# Patient Record
Sex: MALE | Race: WHITE | NOT HISPANIC OR LATINO | Employment: FULL TIME | URBAN - METROPOLITAN AREA
[De-identification: names, ages, dates, MRNs, and addresses within clinical notes are randomized per-mention and may not be internally consistent; named-entity substitution may affect disease eponyms.]

---

## 2017-08-31 ENCOUNTER — LAB (OUTPATIENT)
Dept: LAB | Facility: HOSPITAL | Age: 52
End: 2017-08-31

## 2017-08-31 ENCOUNTER — CONSULT (OUTPATIENT)
Dept: ONCOLOGY | Facility: CLINIC | Age: 52
End: 2017-08-31

## 2017-08-31 VITALS
RESPIRATION RATE: 16 BRPM | SYSTOLIC BLOOD PRESSURE: 162 MMHG | BODY MASS INDEX: 25.83 KG/M2 | TEMPERATURE: 98.3 F | WEIGHT: 174.4 LBS | OXYGEN SATURATION: 98 % | HEART RATE: 77 BPM | DIASTOLIC BLOOD PRESSURE: 98 MMHG | HEIGHT: 69 IN

## 2017-08-31 DIAGNOSIS — R74.8 ABNORMAL LIVER ENZYMES: Primary | ICD-10-CM

## 2017-08-31 DIAGNOSIS — D70.0 CONGENITAL NEUTROPENIA (HCC): Primary | ICD-10-CM

## 2017-08-31 DIAGNOSIS — D70.0 CONGENITAL NEUTROPENIA (HCC): ICD-10-CM

## 2017-08-31 LAB
ALBUMIN SERPL-MCNC: 4.9 G/DL (ref 3.5–5.2)
ALBUMIN/GLOB SERPL: 1.7 G/DL (ref 1.1–2.4)
ALP SERPL-CCNC: 66 U/L (ref 38–116)
ALT SERPL W P-5'-P-CCNC: 54 U/L (ref 0–41)
ANION GAP SERPL CALCULATED.3IONS-SCNC: 14.4 MMOL/L
AST SERPL-CCNC: 38 U/L (ref 0–40)
BASOPHILS # BLD AUTO: 0.03 10*3/MM3 (ref 0–0.1)
BASOPHILS NFR BLD AUTO: 0.6 % (ref 0–1.1)
BILIRUB SERPL-MCNC: 0.5 MG/DL (ref 0.1–1.2)
BUN BLD-MCNC: 19 MG/DL (ref 6–20)
BUN/CREAT SERPL: 23.8 (ref 7.3–30)
CALCIUM SPEC-SCNC: 9.8 MG/DL (ref 8.5–10.2)
CHLORIDE SERPL-SCNC: 99 MMOL/L (ref 98–107)
CO2 SERPL-SCNC: 24.6 MMOL/L (ref 22–29)
CREAT BLD-MCNC: 0.8 MG/DL (ref 0.7–1.3)
DEPRECATED RDW RBC AUTO: 40.7 FL (ref 37–49)
EOSINOPHIL # BLD AUTO: 0.05 10*3/MM3 (ref 0–0.36)
EOSINOPHIL NFR BLD AUTO: 1 % (ref 1–5)
ERYTHROCYTE [DISTWIDTH] IN BLOOD BY AUTOMATED COUNT: 12.1 % (ref 11.7–14.5)
FERRITIN SERPL-MCNC: 276.2 NG/ML (ref 30–400)
FOLATE SERPL-MCNC: 11.22 NG/ML (ref 4.78–24.2)
GFR SERPL CREATININE-BSD FRML MDRD: 102 ML/MIN/1.73
GLOBULIN UR ELPH-MCNC: 2.9 GM/DL (ref 1.8–3.5)
GLUCOSE BLD-MCNC: 89 MG/DL (ref 74–124)
HCT VFR BLD AUTO: 41.5 % (ref 40–49)
HGB BLD-MCNC: 15.3 G/DL (ref 13.5–16.5)
IMM GRANULOCYTES # BLD: 0.07 10*3/MM3 (ref 0–0.03)
IMM GRANULOCYTES NFR BLD: 1.5 % (ref 0–0.5)
IRON 24H UR-MRATE: 128 MCG/DL (ref 59–158)
IRON SATN MFR SERPL: 36 % (ref 14–48)
LYMPHOCYTES # BLD AUTO: 1.6 10*3/MM3 (ref 1–3.5)
LYMPHOCYTES NFR BLD AUTO: 33.2 % (ref 20–49)
MCH RBC QN AUTO: 34 PG (ref 27–33)
MCHC RBC AUTO-ENTMCNC: 36.9 G/DL (ref 32–35)
MCV RBC AUTO: 92.2 FL (ref 83–97)
MONOCYTES # BLD AUTO: 0.52 10*3/MM3 (ref 0.25–0.8)
MONOCYTES NFR BLD AUTO: 10.8 % (ref 4–12)
NEUTROPHILS # BLD AUTO: 2.55 10*3/MM3 (ref 1.5–7)
NEUTROPHILS NFR BLD AUTO: 52.9 % (ref 39–75)
NRBC BLD MANUAL-RTO: 0 /100 WBC (ref 0–0)
PLATELET # BLD AUTO: 170 10*3/MM3 (ref 150–375)
PMV BLD AUTO: 9.7 FL (ref 8.9–12.1)
POTASSIUM BLD-SCNC: 4.5 MMOL/L (ref 3.5–4.7)
PROT SERPL-MCNC: 7.8 G/DL (ref 6.3–8)
RBC # BLD AUTO: 4.5 10*6/MM3 (ref 4.3–5.5)
SODIUM BLD-SCNC: 138 MMOL/L (ref 134–145)
TIBC SERPL-MCNC: 354 MCG/DL (ref 249–505)
TRANSFERRIN SERPL-MCNC: 253 MG/DL (ref 200–360)
VIT B12 BLD-MCNC: 366 PG/ML (ref 211–946)
WBC NRBC COR # BLD: 4.82 10*3/MM3 (ref 4–10)

## 2017-08-31 PROCEDURE — 36415 COLL VENOUS BLD VENIPUNCTURE: CPT | Performed by: INTERNAL MEDICINE

## 2017-08-31 PROCEDURE — 85025 COMPLETE CBC W/AUTO DIFF WBC: CPT | Performed by: INTERNAL MEDICINE

## 2017-08-31 PROCEDURE — 84466 ASSAY OF TRANSFERRIN: CPT | Performed by: INTERNAL MEDICINE

## 2017-08-31 PROCEDURE — 80053 COMPREHEN METABOLIC PANEL: CPT | Performed by: INTERNAL MEDICINE

## 2017-08-31 PROCEDURE — 83540 ASSAY OF IRON: CPT | Performed by: INTERNAL MEDICINE

## 2017-08-31 PROCEDURE — 99244 OFF/OP CNSLTJ NEW/EST MOD 40: CPT | Performed by: INTERNAL MEDICINE

## 2017-08-31 PROCEDURE — 82746 ASSAY OF FOLIC ACID SERUM: CPT | Performed by: INTERNAL MEDICINE

## 2017-08-31 PROCEDURE — 82607 VITAMIN B-12: CPT | Performed by: INTERNAL MEDICINE

## 2017-08-31 PROCEDURE — 82728 ASSAY OF FERRITIN: CPT | Performed by: INTERNAL MEDICINE

## 2017-08-31 PROCEDURE — 36416 COLLJ CAPILLARY BLOOD SPEC: CPT | Performed by: INTERNAL MEDICINE

## 2017-08-31 RX ORDER — NAPROXEN SODIUM 220 MG
220 TABLET ORAL 2 TIMES DAILY PRN
COMMUNITY
End: 2017-11-15

## 2017-08-31 RX ORDER — IBUPROFEN 200 MG
200 TABLET ORAL EVERY 6 HOURS PRN
COMMUNITY
End: 2017-11-15

## 2017-09-01 LAB
ALBUMIN SERPL-MCNC: 4.2 G/DL (ref 2.9–4.4)
ALBUMIN/GLOB SERPL: 1.5 {RATIO} (ref 0.7–1.7)
ALPHA1 GLOB FLD ELPH-MCNC: 0.2 G/DL (ref 0–0.4)
ALPHA2 GLOB SERPL ELPH-MCNC: 0.7 G/DL (ref 0.4–1)
ANA SER QL: NEGATIVE
B-GLOBULIN SERPL ELPH-MCNC: 1.2 G/DL (ref 0.7–1.3)
GAMMA GLOB SERPL ELPH-MCNC: 1 G/DL (ref 0.4–1.8)
GLOBULIN SER CALC-MCNC: 3 G/DL (ref 2.2–3.9)
IGA SERPL-MCNC: 298 MG/DL (ref 90–386)
IGG SERPL-MCNC: 837 MG/DL (ref 700–1600)
IGM SERPL-MCNC: 213 MG/DL (ref 20–172)
INTERPRETATION SERPL IEP-IMP: ABNORMAL
KAPPA LC SERPL-MCNC: 16.8 MG/L (ref 3.3–19.4)
KAPPA LC/LAMBDA SER: 0.95 {RATIO} (ref 0.26–1.65)
LAMBDA LC FREE SERPL-MCNC: 17.7 MG/L (ref 5.7–26.3)
Lab: ABNORMAL
M-SPIKE: ABNORMAL G/DL
PROT SERPL-MCNC: 7.2 G/DL (ref 6–8.5)

## 2017-09-07 LAB — REF LAB TEST METHOD: NORMAL

## 2017-09-13 ENCOUNTER — LAB (OUTPATIENT)
Dept: LAB | Facility: HOSPITAL | Age: 52
End: 2017-09-13

## 2017-09-13 ENCOUNTER — CLINICAL SUPPORT (OUTPATIENT)
Dept: ONCOLOGY | Facility: HOSPITAL | Age: 52
End: 2017-09-13

## 2017-09-13 DIAGNOSIS — D70.0 CONGENITAL NEUTROPENIA (HCC): ICD-10-CM

## 2017-09-13 LAB
BASOPHILS # BLD AUTO: 0.02 10*3/MM3 (ref 0–0.1)
BASOPHILS NFR BLD AUTO: 0.5 % (ref 0–1.1)
DEPRECATED RDW RBC AUTO: 40.8 FL (ref 37–49)
EOSINOPHIL # BLD AUTO: 0.09 10*3/MM3 (ref 0–0.36)
EOSINOPHIL NFR BLD AUTO: 2.2 % (ref 1–5)
ERYTHROCYTE [DISTWIDTH] IN BLOOD BY AUTOMATED COUNT: 12.1 % (ref 11.7–14.5)
HCT VFR BLD AUTO: 39.8 % (ref 40–49)
HGB BLD-MCNC: 14.7 G/DL (ref 13.5–16.5)
IMM GRANULOCYTES # BLD: 0 10*3/MM3 (ref 0–0.03)
IMM GRANULOCYTES NFR BLD: 0 % (ref 0–0.5)
LYMPHOCYTES # BLD AUTO: 1.36 10*3/MM3 (ref 1–3.5)
LYMPHOCYTES NFR BLD AUTO: 33.8 % (ref 20–49)
MCH RBC QN AUTO: 33.9 PG (ref 27–33)
MCHC RBC AUTO-ENTMCNC: 36.9 G/DL (ref 32–35)
MCV RBC AUTO: 91.9 FL (ref 83–97)
MONOCYTES # BLD AUTO: 0.46 10*3/MM3 (ref 0.25–0.8)
MONOCYTES NFR BLD AUTO: 11.4 % (ref 4–12)
NEUTROPHILS # BLD AUTO: 2.09 10*3/MM3 (ref 1.5–7)
NEUTROPHILS NFR BLD AUTO: 52.1 % (ref 39–75)
NRBC BLD MANUAL-RTO: 0 /100 WBC (ref 0–0)
PLATELET # BLD AUTO: 175 10*3/MM3 (ref 150–375)
PMV BLD AUTO: 9.5 FL (ref 8.9–12.1)
RBC # BLD AUTO: 4.33 10*6/MM3 (ref 4.3–5.5)
WBC NRBC COR # BLD: 4.02 10*3/MM3 (ref 4–10)

## 2017-09-13 PROCEDURE — 36416 COLLJ CAPILLARY BLOOD SPEC: CPT | Performed by: INTERNAL MEDICINE

## 2017-09-13 PROCEDURE — 85025 COMPLETE CBC W/AUTO DIFF WBC: CPT | Performed by: INTERNAL MEDICINE

## 2017-09-13 NOTE — PROGRESS NOTES
CBC reviewed with patient. We are monitoring patient's WBC. WBC 4.02 today. Patient has no complaints. We will continue to monitor these to see if we can get a better picture for when patient sees MD next. Looking to see if they trend downwards or stay stable etc. Pt has no questions or concerns and returns in two weeks.

## 2017-09-27 ENCOUNTER — CLINICAL SUPPORT (OUTPATIENT)
Dept: ONCOLOGY | Facility: HOSPITAL | Age: 52
End: 2017-09-27

## 2017-09-27 ENCOUNTER — LAB (OUTPATIENT)
Dept: LAB | Facility: HOSPITAL | Age: 52
End: 2017-09-27

## 2017-09-27 DIAGNOSIS — D70.0 CONGENITAL NEUTROPENIA (HCC): ICD-10-CM

## 2017-09-27 LAB
BASOPHILS # BLD AUTO: 0.02 10*3/MM3 (ref 0–0.1)
BASOPHILS NFR BLD AUTO: 0.6 % (ref 0–1.1)
DEPRECATED RDW RBC AUTO: 41.6 FL (ref 37–49)
EOSINOPHIL # BLD AUTO: 0.04 10*3/MM3 (ref 0–0.36)
EOSINOPHIL NFR BLD AUTO: 1.1 % (ref 1–5)
ERYTHROCYTE [DISTWIDTH] IN BLOOD BY AUTOMATED COUNT: 12.2 % (ref 11.7–14.5)
HCT VFR BLD AUTO: 41.1 % (ref 40–49)
HGB BLD-MCNC: 15 G/DL (ref 13.5–16.5)
IMM GRANULOCYTES # BLD: 0.03 10*3/MM3 (ref 0–0.03)
IMM GRANULOCYTES NFR BLD: 0.8 % (ref 0–0.5)
LYMPHOCYTES # BLD AUTO: 1.29 10*3/MM3 (ref 1–3.5)
LYMPHOCYTES NFR BLD AUTO: 35.6 % (ref 20–49)
MCH RBC QN AUTO: 33.8 PG (ref 27–33)
MCHC RBC AUTO-ENTMCNC: 36.5 G/DL (ref 32–35)
MCV RBC AUTO: 92.6 FL (ref 83–97)
MONOCYTES # BLD AUTO: 0.35 10*3/MM3 (ref 0.25–0.8)
MONOCYTES NFR BLD AUTO: 9.7 % (ref 4–12)
NEUTROPHILS # BLD AUTO: 1.89 10*3/MM3 (ref 1.5–7)
NEUTROPHILS NFR BLD AUTO: 52.2 % (ref 39–75)
NRBC BLD MANUAL-RTO: 0 /100 WBC (ref 0–0)
PLATELET # BLD AUTO: 191 10*3/MM3 (ref 150–375)
PMV BLD AUTO: 9.2 FL (ref 8.9–12.1)
RBC # BLD AUTO: 4.44 10*6/MM3 (ref 4.3–5.5)
WBC NRBC COR # BLD: 3.62 10*3/MM3 (ref 4–10)

## 2017-09-27 PROCEDURE — 85025 COMPLETE CBC W/AUTO DIFF WBC: CPT

## 2017-09-27 PROCEDURE — 36416 COLLJ CAPILLARY BLOOD SPEC: CPT | Performed by: INTERNAL MEDICINE

## 2017-09-27 NOTE — PROGRESS NOTES
CBC reviewed with pt. Reviewing wbc specifically. Today 3.6, has trended downward from 4.8 to 4.0 to 3.6.  He returns in 2 weeks and will see Dr Reagan at this appointment, cbc will be redrawn at that time.  Pt denies any complaints. Copy of labs given.

## 2017-10-11 ENCOUNTER — APPOINTMENT (OUTPATIENT)
Dept: ONCOLOGY | Facility: CLINIC | Age: 52
End: 2017-10-11

## 2017-10-11 ENCOUNTER — APPOINTMENT (OUTPATIENT)
Dept: LAB | Facility: HOSPITAL | Age: 52
End: 2017-10-11

## 2017-11-15 ENCOUNTER — OFFICE VISIT (OUTPATIENT)
Dept: ONCOLOGY | Facility: CLINIC | Age: 52
End: 2017-11-15

## 2017-11-15 ENCOUNTER — LAB (OUTPATIENT)
Dept: LAB | Facility: HOSPITAL | Age: 52
End: 2017-11-15

## 2017-11-15 VITALS
OXYGEN SATURATION: 98 % | DIASTOLIC BLOOD PRESSURE: 96 MMHG | SYSTOLIC BLOOD PRESSURE: 178 MMHG | HEIGHT: 69 IN | RESPIRATION RATE: 16 BRPM | BODY MASS INDEX: 26.72 KG/M2 | TEMPERATURE: 98.4 F | HEART RATE: 78 BPM | WEIGHT: 180.4 LBS

## 2017-11-15 DIAGNOSIS — R74.8 ABNORMAL LIVER ENZYMES: ICD-10-CM

## 2017-11-15 DIAGNOSIS — D70.0 CONGENITAL NEUTROPENIA (HCC): ICD-10-CM

## 2017-11-15 DIAGNOSIS — D70.0 CONGENITAL NEUTROPENIA (HCC): Primary | ICD-10-CM

## 2017-11-15 LAB
BASOPHILS # BLD AUTO: 0.02 10*3/MM3 (ref 0–0.1)
BASOPHILS NFR BLD AUTO: 0.6 % (ref 0–1.1)
DEPRECATED RDW RBC AUTO: 39.4 FL (ref 37–49)
EOSINOPHIL # BLD AUTO: 0.09 10*3/MM3 (ref 0–0.36)
EOSINOPHIL NFR BLD AUTO: 2.6 % (ref 1–5)
ERYTHROCYTE [DISTWIDTH] IN BLOOD BY AUTOMATED COUNT: 11.9 % (ref 11.7–14.5)
HCT VFR BLD AUTO: 39.2 % (ref 40–49)
HGB BLD-MCNC: 14.6 G/DL (ref 13.5–16.5)
HGB RETIC QN: 37.1 PG (ref 29.8–36.1)
IMM GRANULOCYTES # BLD: 0.05 10*3/MM3 (ref 0–0.03)
IMM GRANULOCYTES NFR BLD: 1.5 % (ref 0–0.5)
IMM RETICS NFR: 10.4 % (ref 3–15.8)
LYMPHOCYTES # BLD AUTO: 1.59 10*3/MM3 (ref 1–3.5)
LYMPHOCYTES NFR BLD AUTO: 46.5 % (ref 20–49)
MCH RBC QN AUTO: 34 PG (ref 27–33)
MCHC RBC AUTO-ENTMCNC: 37.2 G/DL (ref 32–35)
MCV RBC AUTO: 91.4 FL (ref 83–97)
MONOCYTES # BLD AUTO: 0.5 10*3/MM3 (ref 0.25–0.8)
MONOCYTES NFR BLD AUTO: 14.6 % (ref 4–12)
NEUTROPHILS # BLD AUTO: 1.17 10*3/MM3 (ref 1.5–7)
NEUTROPHILS NFR BLD AUTO: 34.2 % (ref 39–75)
NRBC BLD MANUAL-RTO: 0 /100 WBC (ref 0–0)
PLATELET # BLD AUTO: 172 10*3/MM3 (ref 150–375)
PMV BLD AUTO: 9.5 FL (ref 8.9–12.1)
RBC # BLD AUTO: 4.29 10*6/MM3 (ref 4.3–5.5)
RETICS/RBC NFR AUTO: 1.44 % (ref 0.6–2)
WBC NRBC COR # BLD: 3.42 10*3/MM3 (ref 4–10)

## 2017-11-15 PROCEDURE — 36416 COLLJ CAPILLARY BLOOD SPEC: CPT | Performed by: INTERNAL MEDICINE

## 2017-11-15 PROCEDURE — 99213 OFFICE O/P EST LOW 20 MIN: CPT | Performed by: INTERNAL MEDICINE

## 2017-11-15 PROCEDURE — 85046 RETICYTE/HGB CONCENTRATE: CPT | Performed by: INTERNAL MEDICINE

## 2017-11-15 PROCEDURE — 85025 COMPLETE CBC W/AUTO DIFF WBC: CPT | Performed by: INTERNAL MEDICINE

## 2017-11-15 NOTE — PROGRESS NOTES
Subjective     REASON FOR CONSULTATION:  Leukopenia    REQUESTING PHYSICIAN:  Thuy Pierson M.D.    History of Present Illness patient is a 52-year-old  with leukopenia comes in for routine evaluation.  We checked  blood counts every 2 weeks to see if he was cycling his white count and then when he was supposed to come in in 8 weeks and missed his appointment and removed in a month because of some family emergencies.  Today's white count is actually low at 3400 with an ANC of 1.17.  The 3 previous CBCs were actually within normal limits  He states he  continues to drink 2-3 glasses of wine on a daily basis but this does not explain the fluctuation. In addition he has taken 4 Advil a day for the last 3 days and this may be an aggravating factor although 3 doses is unlikely to cause a problem.  His B12 ,folate, YARITZA , ferritin and SPEP were normal.    I recommended this time that he stay off Advil and Aleve and just use Tylenol for his pain and cut back on his alcohol drinking to one glass of wine a day and I'll send a flow cytometry to make sure there is no other pathology but suspect based on his family history that there may be a constitutional leukopenia and  that the use of Motrin or Aleve may be aggravating it      Past Medical History:   Diagnosis Date   • Cellulitis of right foot 2014   • Gout 2014   • H/O Toe fracture, right         No past surgical history on file.     HEME HISTORY:  patient is a 52-year-old  with no chronic illnesses except for muscle pains and arthralgias related to his active lifestyle, was noted on 3 occasions in February May and July of this year to have a low white count of 2600 with an ANC of just above 1000 and normal hemoglobin and platelet count.  He has had WBCs as far back as 2013 which was low 3600, I did find 2 white counts in 2015 when he had an acute attack of gout which were in the normal range but this obviously is not his baseline.  He has had no problems with  recurrent infections and is very healthy.  He does take Motrin and Aleve on a daily basis for aches and pains related to bicycling and exercising.  He also drinks at least 2 glasses of wine a day and has been doing this for quite a while.  In addition he tells me that his younger son who was noted to have a low white count and was sent to a pediatric hematologist who could find no reason for it and thought it was just constitutional.  He has had no fever sweats weight loss or other systemic illnesses    He has started taking a multivitamin for the last month since his white count stayed low.  He has had no change in his diet is not a vegetarian.    He has been having colonoscopies since age of 36 because his mother  with colon cancer at age 40 and he is had one polyp which was a low-grade adenoma.  He has  never had CAT scans of the chest or abdomen    Current Outpatient Prescriptions on File Prior to Visit   Medication Sig Dispense Refill   • Multiple Vitamin (ONE-A-DAY MENS PO) Take  by mouth Daily.     • [DISCONTINUED] ibuprofen (ADVIL,MOTRIN) 200 MG tablet Take 200 mg by mouth Every 6 (Six) Hours As Needed for Mild Pain .     • [DISCONTINUED] naproxen sodium (ALEVE) 220 MG tablet Take 220 mg by mouth 2 (Two) Times a Day As Needed.       No current facility-administered medications on file prior to visit.         ALLERGIES:    Allergies   Allergen Reactions   • Sulfa Antibiotics         Social History     Social History   • Marital status:      Spouse name: Janette   • Number of children: 2   • Years of education: High school     Occupational History   • [employer]     Social History Main Topics   • Smoking status: Current Some Day Smoker     Types: Cigarettes   • Smokeless tobacco: None   • Alcohol use Yes      Comment: Daily   • Drug use: No   • Sexual activity: Not Asked     Other Topics Concern   • None     Social History Narrative        Family History   Problem Relation Age of Onset   • Colon  "cancer Mother    • Heart disease Mother    • Cirrhosis Father    • Heart disease Father    • Hypertension Father    • No Known Problems Sister    • No Known Problems Son    • No Known Problems Sister    • No Known Problems Son         Review of Systems   Musculoskeletal: Positive for arthralgias and myalgias.        Objective     Vitals:    11/15/17 0852   BP: 178/96   Pulse: 78   Resp: 16   Temp: 98.4 °F (36.9 °C)   TempSrc: Oral   SpO2: 98%   Weight: 180 lb 6.4 oz (81.8 kg)   Height: 69.33\" (176.1 cm)   PainSc: 0-No pain     Current Status 11/15/2017   ECOG score 0       Physical Exam    GENERAL:  Well-developed, well-nourished in no acute distress.   SKIN:  Warm, dry without rashes, purpura or petechiae.?  Spider angioma on his chest  EYES:  Pupils equal, round and reactive to light.  EOMs intact.  Conjunctivae normal.  EARS:  Hearing intact.  NOSE:  Septum midline.  No excoriations or nasal discharge.  MOUTH:  Tongue is well-papillated; no stomatitis or ulcers.  Lips normal.  THROAT:  Oropharynx without lesions or exudates.  NECK:  Supple with good range of motion; no thyromegaly or masses, no JVD.  LYMPHATICS:  No cervical, supraclavicular, axillary or inguinal adenopathy.  CHEST:  Lungs clear to auscultation. Good airflow.  CARDIAC:  Regular rate and rhythm without murmurs, rubs or gallops. Normal S1,S2.  ABDOMEN:  Soft, nontender with no hepatosplenomegaly or masses.  EXTREMITIES:  No clubbing, cyanosis or edema.  NEUROLOGICAL:  Cranial Nerves II-XII grossly intact.  No focal neurological deficits.  PSYCHIATRIC:  Normal affect and mood.        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  Plan  1.Avoid Motrin and Aleve and use Tylenol for pain  2.  Flow cytometry on peripheral blood  3.  Decrease alcohol intake to one glass of wine or less per day  4.  CBC in 3 months and see me in 6 months and I'll call him if there is any abnormality in the flow cytometry

## 2018-02-07 ENCOUNTER — LAB (OUTPATIENT)
Dept: LAB | Facility: HOSPITAL | Age: 53
End: 2018-02-07

## 2018-02-07 DIAGNOSIS — D70.0 CONGENITAL NEUTROPENIA (HCC): ICD-10-CM

## 2018-02-07 LAB
BASOPHILS # BLD AUTO: 0.02 10*3/MM3 (ref 0–0.1)
BASOPHILS NFR BLD AUTO: 0.5 % (ref 0–1.1)
DEPRECATED RDW RBC AUTO: 40.4 FL (ref 37–49)
EOSINOPHIL # BLD AUTO: 0.03 10*3/MM3 (ref 0–0.36)
EOSINOPHIL NFR BLD AUTO: 0.7 % (ref 1–5)
ERYTHROCYTE [DISTWIDTH] IN BLOOD BY AUTOMATED COUNT: 12 % (ref 11.7–14.5)
HCT VFR BLD AUTO: 41.8 % (ref 40–49)
HGB BLD-MCNC: 15.2 G/DL (ref 13.5–16.5)
IMM GRANULOCYTES # BLD: 0.02 10*3/MM3 (ref 0–0.03)
IMM GRANULOCYTES NFR BLD: 0.5 % (ref 0–0.5)
LYMPHOCYTES # BLD AUTO: 1.18 10*3/MM3 (ref 1–3.5)
LYMPHOCYTES NFR BLD AUTO: 28.2 % (ref 20–49)
MCH RBC QN AUTO: 33.5 PG (ref 27–33)
MCHC RBC AUTO-ENTMCNC: 36.4 G/DL (ref 32–35)
MCV RBC AUTO: 92.1 FL (ref 83–97)
MONOCYTES # BLD AUTO: 0.45 10*3/MM3 (ref 0.25–0.8)
MONOCYTES NFR BLD AUTO: 10.8 % (ref 4–12)
NEUTROPHILS # BLD AUTO: 2.48 10*3/MM3 (ref 1.5–7)
NEUTROPHILS NFR BLD AUTO: 59.3 % (ref 39–75)
NRBC BLD MANUAL-RTO: 0 /100 WBC (ref 0–0)
PLATELET # BLD AUTO: 181 10*3/MM3 (ref 150–375)
PMV BLD AUTO: 9.4 FL (ref 8.9–12.1)
RBC # BLD AUTO: 4.54 10*6/MM3 (ref 4.3–5.5)
WBC NRBC COR # BLD: 4.18 10*3/MM3 (ref 4–10)

## 2018-02-07 PROCEDURE — 36416 COLLJ CAPILLARY BLOOD SPEC: CPT | Performed by: INTERNAL MEDICINE

## 2018-02-07 PROCEDURE — 85025 COMPLETE CBC W/AUTO DIFF WBC: CPT | Performed by: INTERNAL MEDICINE

## 2018-05-08 ENCOUNTER — APPOINTMENT (OUTPATIENT)
Dept: LAB | Facility: HOSPITAL | Age: 53
End: 2018-05-08

## 2018-05-08 ENCOUNTER — APPOINTMENT (OUTPATIENT)
Dept: ONCOLOGY | Facility: CLINIC | Age: 53
End: 2018-05-08

## 2018-06-05 ENCOUNTER — LAB (OUTPATIENT)
Dept: LAB | Facility: HOSPITAL | Age: 53
End: 2018-06-05

## 2018-06-05 ENCOUNTER — OFFICE VISIT (OUTPATIENT)
Dept: ONCOLOGY | Facility: CLINIC | Age: 53
End: 2018-06-05

## 2018-06-05 VITALS
TEMPERATURE: 98.7 F | RESPIRATION RATE: 16 BRPM | HEART RATE: 72 BPM | DIASTOLIC BLOOD PRESSURE: 80 MMHG | OXYGEN SATURATION: 97 % | BODY MASS INDEX: 26.36 KG/M2 | SYSTOLIC BLOOD PRESSURE: 140 MMHG | WEIGHT: 178 LBS | HEIGHT: 69 IN

## 2018-06-05 DIAGNOSIS — D70.0 CONGENITAL NEUTROPENIA (HCC): ICD-10-CM

## 2018-06-05 DIAGNOSIS — D70.0 CONGENITAL NEUTROPENIA (HCC): Primary | ICD-10-CM

## 2018-06-05 PROBLEM — D72.819 LEUKOPENIA: Status: ACTIVE | Noted: 2018-06-05

## 2018-06-05 LAB
BASOPHILS # BLD AUTO: 0.01 10*3/MM3 (ref 0–0.1)
BASOPHILS NFR BLD AUTO: 0.3 % (ref 0–1.1)
DEPRECATED RDW RBC AUTO: 40.1 FL (ref 37–49)
EOSINOPHIL # BLD AUTO: 0.09 10*3/MM3 (ref 0–0.36)
EOSINOPHIL NFR BLD AUTO: 2.6 % (ref 1–5)
ERYTHROCYTE [DISTWIDTH] IN BLOOD BY AUTOMATED COUNT: 12 % (ref 11.7–14.5)
HCT VFR BLD AUTO: 40.5 % (ref 40–49)
HGB BLD-MCNC: 14.8 G/DL (ref 13.5–16.5)
IMM GRANULOCYTES # BLD: 0.02 10*3/MM3 (ref 0–0.03)
IMM GRANULOCYTES NFR BLD: 0.6 % (ref 0–0.5)
LYMPHOCYTES # BLD AUTO: 1.47 10*3/MM3 (ref 1–3.5)
LYMPHOCYTES NFR BLD AUTO: 42.9 % (ref 20–49)
MCH RBC QN AUTO: 33.4 PG (ref 27–33)
MCHC RBC AUTO-ENTMCNC: 36.5 G/DL (ref 32–35)
MCV RBC AUTO: 91.4 FL (ref 83–97)
MONOCYTES # BLD AUTO: 0.39 10*3/MM3 (ref 0.25–0.8)
MONOCYTES NFR BLD AUTO: 11.4 % (ref 4–12)
NEUTROPHILS # BLD AUTO: 1.45 10*3/MM3 (ref 1.5–7)
NEUTROPHILS NFR BLD AUTO: 42.2 % (ref 39–75)
NRBC BLD MANUAL-RTO: 0 /100 WBC (ref 0–0)
PLATELET # BLD AUTO: 174 10*3/MM3 (ref 150–375)
PMV BLD AUTO: 9.4 FL (ref 8.9–12.1)
RBC # BLD AUTO: 4.43 10*6/MM3 (ref 4.3–5.5)
WBC NRBC COR # BLD: 3.43 10*3/MM3 (ref 4–10)

## 2018-06-05 PROCEDURE — 99213 OFFICE O/P EST LOW 20 MIN: CPT | Performed by: INTERNAL MEDICINE

## 2018-06-05 PROCEDURE — 85025 COMPLETE CBC W/AUTO DIFF WBC: CPT | Performed by: INTERNAL MEDICINE

## 2018-06-05 PROCEDURE — 36415 COLL VENOUS BLD VENIPUNCTURE: CPT | Performed by: INTERNAL MEDICINE

## 2018-06-05 NOTE — PROGRESS NOTES
Subjective     REASON FOR CONSULTATION:   1. Leukopenia-likely constitutional although alcohol and Advil may be contributing    REQUESTING PHYSICIAN:  Thuy Pierson M.D.    History of Present Illness patient is a 52-year-old  with leukopenia comes in for routine evaluation.  We checked  blood counts every 2 weeks to see if he was cycling his white count and then when he was supposed to come in in 8 weeks and missed his appointment and removed in a month because of some family emergencies.  Today's white count is actually low at 3400 with an ANC of 1.17.  The 3 previous CBCs were actually within normal limits  He states he  continues to drink 2-3 glasses of wine on a daily basis but this does not explain the fluctuation. In addition he has taken 4 Advil a day for the last 3 days and this may be an aggravating factor although 3 doses is unlikely to cause a problem.  His B12 ,folate, YARITZA , ferritin and SPEP were normal.  Flow cytometry on his peripheral blood in September was negative     based on his family history with his son having a low white count there may be a constitutional leukopenia and  that the use of Motrin or Aleve may be aggravating it      Past Medical History:   Diagnosis Date   • Cellulitis of right foot 2014   • Gout 2014   • H/O Toe fracture, right    • History of colon polyps    • Hyperlipidemia         Past Surgical History:   Procedure Laterality Date   • COLONOSCOPY     • TONSILLECTOMY          HEME HISTORY:  patient is a 52-year-old  with no chronic illnesses except for muscle pains and arthralgias related to his active lifestyle, was noted on 3 occasions in February May and July of this year to have a low white count of 2600 with an ANC of just above 1000 and normal hemoglobin and platelet count.  He has had WBCs as far back as 2013 which was low 3600, I did find 2 white counts in 2015 when he had an acute attack of gout which were in the normal range but this obviously is not his  baseline.  He has had no problems with recurrent infections and is very healthy.  He does take Motrin and Aleve on a daily basis for aches and pains related to bicycling and exercising.  He also drinks at least 2 glasses of wine a day and has been doing this for quite a while.  In addition he tells me that his younger son who was noted to have a low white count and was sent to a pediatric hematologist who could find no reason for it and thought it was just constitutional.  He has had no fever sweats weight loss or other systemic illnesses    He has started taking a multivitamin for the last month since his white count stayed low.  He has had no change in his diet is not a vegetarian.    He has been having colonoscopies since age of 36 because his mother  with colon cancer at age 40 and he is had one polyp which was a low-grade adenoma.  He has  never had CAT scans of the chest or abdomen    Current Outpatient Prescriptions on File Prior to Visit   Medication Sig Dispense Refill   • Multiple Vitamin (ONE-A-DAY MENS PO) Take  by mouth Daily.       No current facility-administered medications on file prior to visit.         ALLERGIES:    Allergies   Allergen Reactions   • Sulfa Antibiotics         Social History     Social History   • Marital status:      Spouse name: Janette   • Number of children: 2   • Years of education: High school     Occupational History   • [employer]     Social History Main Topics   • Smoking status: Current Some Day Smoker     Types: Cigarettes   • Smokeless tobacco: Never Used      Comment: 2-3 Cigarettes weekly   • Alcohol use Yes      Comment: Daily   • Drug use: No     Other Topics Concern   • Not on file        Family History   Problem Relation Age of Onset   • Colon cancer Mother 40   • Heart disease Mother    • Cirrhosis Father    • Heart disease Father    • Hypertension Father    • No Known Problems Sister    • No Known Problems Son    • No Known Problems Sister    • No  "Known Problems Son         Review of Systems   Musculoskeletal: Positive for arthralgias and myalgias.        Objective     Vitals:    06/05/18 1210   BP: 140/80   Pulse: 72   Resp: 16   Temp: 98.7 °F (37.1 °C)   SpO2: 97%  Comment: at rest   Weight: 80.7 kg (178 lb)   Height: 176 cm (69.29\")  Comment: new ht w/shoes   PainSc: 0-No pain     Current Status 6/5/2018   ECOG score 0       Physical Exam    GENERAL:  Well-developed, well-nourished in no acute distress.   SKIN:  Warm, dry without rashes, purpura or petechiae.?  Spider angioma on his chest  EYES:  Pupils equal, round and reactive to light.  EOMs intact.  Conjunctivae normal.  EARS:  Hearing intact.  NOSE:  Septum midline.  No excoriations or nasal discharge.  MOUTH:  Tongue is well-papillated; no stomatitis or ulcers.  Lips normal.  THROAT:  Oropharynx without lesions or exudates.  NECK:  Supple with good range of motion; no thyromegaly or masses, no JVD.  LYMPHATICS:  No cervical, supraclavicular, axillary or inguinal adenopathy.  CHEST:  Lungs clear to auscultation. Good airflow.  CARDIAC:  Regular rate and rhythm without murmurs, rubs or gallops. Normal S1,S2.  ABDOMEN:  Soft, nontender with no hepatosplenomegaly or masses.  EXTREMITIES:  No clubbing, cyanosis or edema.  NEUROLOGICAL:  Cranial Nerves II-XII grossly intact.  No focal neurological deficits.  PSYCHIATRIC:  Normal affect and mood.        RECENT LABS:  Hematology WBC   Date Value Ref Range Status   06/05/2018 3.43 (L) 4.00 - 10.00 10*3/mm3 Final     RBC   Date Value Ref Range Status   06/05/2018 4.43 4.30 - 5.50 10*6/mm3 Final     Hemoglobin   Date Value Ref Range Status   06/05/2018 14.8 13.5 - 16.5 g/dL Final     Hematocrit   Date Value Ref Range Status   06/05/2018 40.5 40.0 - 49.0 % Final     Platelets   Date Value Ref Range Status   06/05/2018 174 150 - 375 10*3/mm3 Final        Peripheral smear shows frequent stomatocytes, no immature white cells and adequate platelets    Flow cytometry " of peripheral blood in 11/17 was normal    Assessment/Plan     1 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  Plan  1.Avoid Motrin and Aleve and use Tylenol for pain  2.  Decrease alcohol intake to one glass of wine or less per day  3.  CBC twice a year at  Dr. Huynh office with follow-up to with me if there is  anemia or thrombocytopenia

## 2018-06-19 ENCOUNTER — TRANSCRIBE ORDERS (OUTPATIENT)
Dept: ADMINISTRATIVE | Facility: HOSPITAL | Age: 53
End: 2018-06-19

## 2018-06-19 DIAGNOSIS — R74.8 ABNORMAL LIVER ENZYMES: Primary | ICD-10-CM

## 2018-06-28 ENCOUNTER — APPOINTMENT (OUTPATIENT)
Dept: ULTRASOUND IMAGING | Facility: HOSPITAL | Age: 53
End: 2018-06-28
Attending: INTERNAL MEDICINE

## 2018-07-10 ENCOUNTER — HOSPITAL ENCOUNTER (OUTPATIENT)
Dept: ULTRASOUND IMAGING | Facility: HOSPITAL | Age: 53
Discharge: HOME | End: 2018-07-10
Attending: INTERNAL MEDICINE | Admitting: INTERNAL MEDICINE

## 2018-07-10 DIAGNOSIS — R74.8 ABNORMAL LIVER ENZYMES: ICD-10-CM

## 2018-07-10 PROCEDURE — 76705 ECHO EXAM OF ABDOMEN: CPT
